# Patient Record
(demographics unavailable — no encounter records)

---

## 2025-07-11 NOTE — HISTORY OF PRESENT ILLNESS
[FreeTextEntry1] : 56 y/o female, previously followed by Dr. Foley, presents for f/u. No chest pain, dyspnea, orthopnea or PND. No syncope, no palpitations. Trying to loose weight with diet, but has difficulty walking due to knee and back pain. She reports episodes of orthostatic, dizziness when standing up. No vertigo sensation. No palpitations. No palpitations. Started on Monjaro, reports she lost some weight with it. Labs form Feb 2024 reviewed. Reports she is under stress - brother was diagnosed with CAD, father passed away with ischemic CVA/AF.  Reports no new symptoms, BP is controlled. ET is limited due to leg pain - has meniscal tear, considering surgery. Scheduled for labs with PMD next week - sees Dr. Adams in [facility].  Cath: 9/2017 - normal coronaries. 2D echo 09/2017 - normal LV function, no significant valvular disease. Stress echo 09/2017 No wall motion abnormalities, however the patient had a run of non-sustained VT and non-specific ST depressions with exertion.

## 2025-07-11 NOTE — ASSESSMENT
[FreeTextEntry1] : Prior records reviewed. Dyspnea improved. No palpitations, negative Holter. Hydration discussed. C/w losartan PM dose. No structural heart disease. Had cath - normal coronaries. Had f/u with EP - c/w metoprolol.  At this time, c/w medical therapy, including ARB and BB. Patient was advised about healthy lifestyle changes, including diet and exercise. Importance of sustained long-term weight loss was discussed, questions answered.  F/u with PMD - will get labs from [facility]  F/u in 6 months

## 2025-07-11 NOTE — PHYSICAL EXAM
[General Appearance - Well Developed] : well developed [Well Groomed] : well groomed [General Appearance - Well Nourished] : well nourished [No Deformities] : no deformities [General Appearance - In No Acute Distress] : no acute distress [Normal Conjunctiva] : the conjunctiva exhibited no abnormalities [Normal Oral Mucosa] : normal oral mucosa [] : no respiratory distress [Respiration, Rhythm And Depth] : normal respiratory rhythm and effort [Exaggerated Use Of Accessory Muscles For Inspiration] : no accessory muscle use [Auscultation Breath Sounds / Voice Sounds] : lungs were clear to auscultation bilaterally [Heart Rate And Rhythm] : heart rate and rhythm were normal [Heart Sounds] : normal S1 and S2 [Murmurs] : no murmurs present [Edema] : no peripheral edema present [Bowel Sounds] : normal bowel sounds [Abdomen Soft] : soft [Abdomen Tenderness] : non-tender [Abnormal Walk] : normal gait [Nail Clubbing] : no clubbing of the fingernails [Cyanosis, Localized] : no localized cyanosis [Petechial Hemorrhages (___cm)] : no petechial hemorrhages [Skin Color & Pigmentation] : normal skin color and pigmentation [Skin Turgor] : normal skin turgor [Oriented To Time, Place, And Person] : oriented to person, place, and time [Affect] : the affect was normal [FreeTextEntry1] : no JVD